# Patient Record
Sex: MALE | Race: WHITE | NOT HISPANIC OR LATINO | Employment: OTHER | URBAN - METROPOLITAN AREA
[De-identification: names, ages, dates, MRNs, and addresses within clinical notes are randomized per-mention and may not be internally consistent; named-entity substitution may affect disease eponyms.]

---

## 2022-05-05 ENCOUNTER — PATIENT MESSAGE (OUTPATIENT)
Dept: RESEARCH | Facility: HOSPITAL | Age: 77
End: 2022-05-05

## 2024-06-12 ENCOUNTER — OFFICE VISIT (OUTPATIENT)
Dept: NEUROLOGY | Facility: CLINIC | Age: 79
End: 2024-06-12
Payer: MEDICARE

## 2024-06-12 VITALS
DIASTOLIC BLOOD PRESSURE: 74 MMHG | HEIGHT: 72 IN | WEIGHT: 175 LBS | BODY MASS INDEX: 23.7 KG/M2 | SYSTOLIC BLOOD PRESSURE: 122 MMHG

## 2024-06-12 DIAGNOSIS — G61.81 CIDP (CHRONIC INFLAMMATORY DEMYELINATING POLYNEUROPATHY): ICD-10-CM

## 2024-06-12 DIAGNOSIS — G20.B1 PARKINSON'S DISEASE WITH DYSKINESIA WITHOUT FLUCTUATING MANIFESTATIONS: Primary | ICD-10-CM

## 2024-06-12 PROCEDURE — 99214 OFFICE O/P EST MOD 30 MIN: CPT | Mod: PBBFAC | Performed by: SPECIALIST

## 2024-06-12 PROCEDURE — 99999 PR PBB SHADOW E&M-EST. PATIENT-LVL IV: CPT | Mod: PBBFAC,,, | Performed by: SPECIALIST

## 2024-06-12 PROCEDURE — 99205 OFFICE O/P NEW HI 60 MIN: CPT | Mod: S$PBB,,, | Performed by: SPECIALIST

## 2024-06-12 RX ORDER — IMMUNE GLOBULIN (HUMAN) 10 G/100ML
66 INJECTION INTRAVENOUS; SUBCUTANEOUS
Qty: 660 ML | Refills: 5 | Status: SHIPPED | OUTPATIENT
Start: 2024-07-19

## 2024-06-12 RX ORDER — CARBIDOPA AND LEVODOPA 25; 100 MG/1; MG/1
TABLET ORAL
Qty: 360 TABLET | Refills: 11 | Status: SHIPPED | OUTPATIENT
Start: 2024-06-12

## 2024-06-12 RX ORDER — MYCOPHENOLATE MOFETIL 500 MG/1
TABLET ORAL
Qty: 360 TABLET | Refills: 3 | Status: SHIPPED | OUTPATIENT
Start: 2024-06-12 | End: 2024-06-13

## 2024-06-12 NOTE — PROGRESS NOTES
Subjective:      @Patient ID: John M Chevalier is a 78 y.o. male.    Chief Complaint/referral reason/HPI:   Chief Complaint   Patient presents with    NP ref by Dr Lawrence for neuro cons to michelle for Neuropathy.      C/O B arm, B Hand, B Leg and back weakness. Dx w PD in 2016. B hand tremors. Denies a freezing shuffled gait. Denies hallucinations. Diff w speech, swallowing and hallucinations. Pt is anxious. Not sleeping well wo vivid dreams. Moves a lot in his sleep, arms sling around. Drives only with a 1 mile radius, lives at home w his wife and needs asst w dressing.        They live in [place] / [facility]   they'd seen Dr Itzel Sahu at [facility] and she'd taught Benjamin Lawrence thus they went to see Howard in [facility] [ but he's moved now also]   They'd also seen Ondo a number of times [mvment disorder]    Addn MD hx comments:  CD LD 1.5 qid   05, 100  1430  1700   On IVIg last 9 mo or so    q 3w then a mo ago increased it to every other week [to treat foot drop]     See also 'facesheet' under media for handwritten notes     Review of Systems         Marital status:   sloane Collazoe a   age 66 or so     [] Working     [x] Retired, worked as:   [x] Drives     'very little'     -------------------------------------    CIDP (chronic inflammatory demyelinating polyneuropathy)    Parkinson's disease     ..  Current Outpatient Medications   Medication Instructions    amantadine HCL (SYMMETREL) 100 mg, Oral, 2 times daily    carbidopa-levodopa  mg (SINEMET)  mg per tablet TAKE 1 AND 1/2 TABLET BY MOUTH 4 TIMES A DAY    cholecalciferol (vitamin D3) (VITAMIN D3) 1,000 Units, Oral    cyanocobalamin (vitamin B-12) 1,000 mcg, Sublingual    DULoxetine (CYMBALTA) 60 mg, Oral    FA/niacinamide/cupric ox/Zn ox (NICOTINAMIDE ORAL) 500 mg, 2 times daily    fludrocortisone (FLORINEF) 200 mcg, Oral, Daily    GAMUNEX-C 5 gram/50 mL (10 %) Soln No dose, route, or frequency recorded.    Lactobacillus rhamnosus GG  (CULTURELLE) 10 billion cell capsule 1 capsule, Oral, Daily    meloxicam (MOBIC) 7.5 mg, Oral, Daily    tamsulosin (FLOMAX) 0.4 mg Cap 1 capsule, Oral, Daily    vitamin B complex-folic acid 0.4 mg Tab 1 tablet, Oral, Daily      Fludrocort 0.1mg once daily     Objective:      Exam:   Visit Vitals  /74   Ht 6' (1.829 m)   Wt 79.4 kg (175 lb)   BMI 23.73 kg/m²     General Exam  If Accompanied, by__       body habitus_ Body mass index is 23.73 kg/m².  Gen exam     Neurological:    Exam comments:  Facial grimace  Otherw cn's ok   Motor in UE's ok   L ankle df weakness only discernible in chair   in other words walking in his boots I could not discern which ankle was his weaker one   No tremor but BUE cogwheeling   R toe tonically up and some R foot tremor               No data to display                 Neuroimaging:  [] Images and imaging reports reviewed. Rads summary:  My comments:     Labs:    [x]  New Patient         []  Multiple Issues/ diagnoses or problems  [if not enumerated in note then discussed but not documented]    Complexity of Data:   [x] High    [] Moderate   [] Images and reports reviewed [x] History obtained from accompaniment  [] Studies ordered [] Studies consid or discussed, not ordered   [] Differential Diagnoses discussed       Risks:   [x] High     [] Moderate   [x] (poss or def) neurodegenerative condition [] () autoimmune condition with possibility of flares or unexpected attack  [] () seiz d.o. with possib of recurr seiz's  [] Cerebrovasc ds with risk of recurrent stroke  [] CNS meds (and/or) potentially high risk non CNS meds taken or discussed which may cause med or behav SE's  [x] Fall risk [x] Driving discussed  [] Diagnosis unclear or DDx wide making risk uncertain   []:    MDM:    [] High     [] Moderate       Assessment/Plan:         ICD-10-CM ICD-9-CM   1. Parkinson's disease with dyskinesia without fluctuating manifestations  G20.B1 332.0   2. CIDP (chronic inflammatory  demyelinating polyneuropathy)  G61.81 357.81         Other Comments / Follow Up:        Medications Ordered This Encounter   Medications    carbidopa-levodopa  mg (SINEMET)  mg per tablet     Si TABLET BY MOUTH 4 TIMES A DAY     Dispense:  360 tablet     Refill:  11    GAMUNEX-C 5 gram/50 mL (10 %) Soln     Sig: Inject 650 mLs (65 g total) into the vein every 30 days.     Dispense:  660 mL     Refill:  5    mycophenolate (CELLCEPT) 500 mg Tab     Simg bid one month then 1000mg bid     Dispense:  360 tablet     Refill:  3      No orders of the defined types were placed in this encounter.     Patient Instructions    Stop amantadine   Decrease levodopa to one tab four times daily   Mycophenolate 500mg twice daily one month then 100mg twice daily ([facility])   IVig monthly [[facility] )    Aim revisit 2 mos       Franklin Perry MD HUE FAAN, [facility]  Neuroscience Center Medical Director   [facility]

## 2024-06-13 DIAGNOSIS — G61.81 CIDP (CHRONIC INFLAMMATORY DEMYELINATING POLYNEUROPATHY): ICD-10-CM

## 2024-06-13 RX ORDER — MYCOPHENOLATE MOFETIL 500 MG/1
TABLET ORAL
Start: 2024-06-13 | End: 2025-07-13

## 2024-06-13 RX ORDER — MYCOPHENOLATE MOFETIL 500 MG/1
TABLET ORAL
Qty: 360 TABLET | Refills: 3 | Status: SHIPPED | OUTPATIENT
Start: 2024-06-13 | End: 2025-07-13

## 2024-06-14 ENCOUNTER — TELEPHONE (OUTPATIENT)
Dept: NEUROLOGY | Facility: CLINIC | Age: 79
End: 2024-06-14
Payer: MEDICARE

## 2024-06-14 NOTE — TELEPHONE ENCOUNTER
Spec Cust care Team: [facility]: [phone]: More  ..  Pharm: [phone]: Minnie: pharmacist: gave verbal order for change in Gamunex: provider/dose changes; will continue all premed orders as previous.  She will fax over update to sign off  ..  Once order is in and reviewed by secondary pharmacist, will go to [facility] service for nursing staff to be alerted to changes

## 2024-07-19 ENCOUNTER — PATIENT MESSAGE (OUTPATIENT)
Dept: NEUROLOGY | Facility: CLINIC | Age: 79
End: 2024-07-19
Payer: MEDICARE

## 2024-09-03 ENCOUNTER — PATIENT MESSAGE (OUTPATIENT)
Dept: NEUROLOGY | Facility: CLINIC | Age: 79
End: 2024-09-03
Payer: MEDICARE

## 2024-09-04 NOTE — TELEPHONE ENCOUNTER
Pharmacist [facility] contacted office regarding attached portal message and wanted to verify that pt was ok to proceed w Gamunex infusion next week.    Call back #: [phone]  Opt 2 for pharmacist line

## 2024-09-19 ENCOUNTER — OFFICE VISIT (OUTPATIENT)
Dept: NEUROLOGY | Facility: CLINIC | Age: 79
End: 2024-09-19
Payer: MEDICARE

## 2024-09-19 VITALS
DIASTOLIC BLOOD PRESSURE: 98 MMHG | BODY MASS INDEX: 25.06 KG/M2 | HEIGHT: 72 IN | SYSTOLIC BLOOD PRESSURE: 140 MMHG | WEIGHT: 185 LBS

## 2024-09-19 DIAGNOSIS — G47.52 RBD (REM BEHAVIORAL DISORDER): ICD-10-CM

## 2024-09-19 DIAGNOSIS — G20.B1 PARKINSON'S DISEASE WITH DYSKINESIA WITHOUT FLUCTUATING MANIFESTATIONS: Primary | ICD-10-CM

## 2024-09-19 DIAGNOSIS — G61.81 CIDP (CHRONIC INFLAMMATORY DEMYELINATING POLYNEUROPATHY): ICD-10-CM

## 2024-09-19 PROCEDURE — 99999 PR PBB SHADOW E&M-EST. PATIENT-LVL III: CPT | Mod: PBBFAC,,, | Performed by: SPECIALIST

## 2024-09-19 PROCEDURE — 99215 OFFICE O/P EST HI 40 MIN: CPT | Mod: S$PBB,,, | Performed by: SPECIALIST

## 2024-09-19 PROCEDURE — 99213 OFFICE O/P EST LOW 20 MIN: CPT | Mod: PBBFAC | Performed by: SPECIALIST

## 2024-09-19 RX ORDER — DULOXETIN HYDROCHLORIDE 30 MG/1
CAPSULE, DELAYED RELEASE ORAL
Qty: 180 CAPSULE | Refills: 3 | Status: SHIPPED | OUTPATIENT
Start: 2024-09-19

## 2024-09-19 RX ORDER — MYCOPHENOLATE MOFETIL 500 MG/1
1000 TABLET ORAL 2 TIMES DAILY
Qty: 360 TABLET | Refills: 3
Start: 2024-09-19 | End: 2025-09-14

## 2024-09-19 RX ORDER — CHLORHEXIDINE GLUCONATE ORAL RINSE 1.2 MG/ML
5 SOLUTION DENTAL
COMMUNITY
Start: 2024-08-28

## 2024-09-19 RX ORDER — EPINEPHRINE 0.3 MG/.3ML
INJECTION SUBCUTANEOUS
COMMUNITY

## 2024-09-19 RX ORDER — IMMUNE GLOBULIN (HUMAN) 10 G/100ML
66 INJECTION INTRAVENOUS; SUBCUTANEOUS
Qty: 660 ML | Refills: 5 | Status: SHIPPED | OUTPATIENT
Start: 2024-09-19

## 2024-09-19 RX ORDER — CARBIDOPA AND LEVODOPA 25; 100 MG/1; MG/1
TABLET ORAL
Qty: 540 TABLET | Refills: 11 | Status: SHIPPED | OUTPATIENT
Start: 2024-09-19

## 2024-09-19 NOTE — PROGRESS NOTES
"Established Parkinson's Patient   SUBJECTIVE:  Patient ID: John M Chevalier   78 y.o.     Chief Complaint: PD fu    History of Present Illness:  John M Chevalier is a 78 y.o. male who presents to clinic for Parkinsons Disease follow-up   CIDP & PD   Pt presents to clinic w/ wife for a 2 month f/u. Pt reports numbness in legs remain the same and reports having drop foot in L leg. States B hand tremor has also remained the same. Worsening balance. Pt denies hallucinations or vivid dreams. Wife states pt sleeps "terribly." Reports some RBD, acting out in his sleep. Wife reports diff swallowing, drooling, and choking. Denies shuffled/freezing gait. Dyskinesias have stopped since being off the amantadine. [place], doesn't drive in town.     Currently taking CD/LD 1.5 tabs QID  On IVIG once a month    Review of Systems: as per HPI, otherwise a balanced 10 systems review is negative.        Past Medical History:   Diagnosis Date    CIDP (chronic inflammatory demyelinating polyneuropathy)     Parkinson's disease        Past Surgical History:   Procedure Laterality Date    HERNIA REPAIR      Removal of plate      SKIN CANCER EXCISION Bilateral 04/04/2023    Nose       Family History   Problem Relation Name Age of Onset    No Known Problems Mother      Cancer Father      Heart attack Father         Social History     Socioeconomic History    Marital status:    Tobacco Use    Smoking status: Never     Passive exposure: Never    Smokeless tobacco: Never   Substance and Sexual Activity    Alcohol use: Never    Drug use: Never    Sexual activity: Not Currently     Partners: Female       Review of patient's allergies indicates:  No Known Allergies    Current Medications:  Current Outpatient Medications   Medication Instructions    carbidopa-levodopa  mg (SINEMET)  mg per tablet TAKE 1 AND 1/2 TABLET BY MOUTH 4 TIMES A DAY    chlorhexidine (PERIDEX) 0.12 % solution 5 mLs    " cholecalciferol (vitamin D3) (VITAMIN D3) 1,000 Units, Oral    cyanocobalamin (vitamin B-12) 1,000 mcg, Sublingual    DULoxetine (CYMBALTA) 60 mg, Oral    EPINEPHrine (EPIPEN) 0.3 mg/0.3 mL AtIn Intramuscular    fludrocortisone (FLORINEF) 100 mcg, Oral    GAMUNEX-C 5 gram/50 mL (10 %) Soln 65 g, Intravenous, Every 30 days    meloxicam (MOBIC) 7.5 mg, Oral, Daily    mycophenolate (CELLCEPT) 500 mg Tab Take 1 tablet (500 mg total) by mouth 2 (two) times daily for 30 days, THEN 2 tablets (1,000 mg total) 2 (two) times daily.    mycophenolate (CELLCEPT) 500 mg Tab Take 1 tablet (500 mg total) by mouth 2 (two) times daily for 30 days, THEN 2 tablets (1,000 mg total) 2 (two) times daily.    tamsulosin (FLOMAX) 0.4 mg Cap 1 capsule, Oral, Daily    vitamin B complex-folic acid 0.4 mg Tab 1 tablet, Oral, Daily       OBJECTIVE:  Vitals:  BP (!) 140/98 (BP Location: Left arm, Patient Position: Sitting)   Ht 6' (1.829 m)   Wt 83.9 kg (185 lb)   BMI 25.09 kg/m²      Physical Exam:  General Exam  Accompanied by - wife  appearance - flat affect; decreased eye blink  heart - RRR auscultated without murmur  lungs - pulmonary effort normal  skin- normal for ethnicity, no obvious lesions noted    Neurologic Exam  Cortical function - The patient is alert, attentive, and oriented  Speech - hypophonia  Cranial nerves:  CN III, IV, VI -  EOMs intact. No ptosis or lateral gaze deviation  CN VII - no facial asymmetry; normal eye closure and smile  CN VIII - hearing is grossly normal  CN XII - tongue protrudes midline  Motor - No pronator drift. UE strength bilateral 5/5  Gait - unassisted, stooped posture. Steppage gait. Decreased arm swing. Precarious turns  Coordination - finger to nose intact, no dysmetria  tremor - none during visit today  Bradykinesia - moderate    Review of Data:   Prior notes reviewed     Imaging:  No results found for this or any previous visit.      ASSESSMENT /PLAN:    1. Parkinson's disease with dyskinesia  without fluctuating manifestations  -     carbidopa-levodopa  mg (SINEMET)  mg per tablet; TAKE 1 AND 1/2 TABLET BY MOUTH 4 TIMES A DAY  Dispense: 540 tablet; Refill: 11    Parkinson's medications can be associated with certain side effects.  Nausea and abdominal symptoms typically improve in time.  Impulse control disorders including persistent thoughts or behaviors involving shopping, gambling, or sex can be problematic.  Excessive daytime sleepiness including car crashes have been reported.   Delusions, hallucinations, and paranoia can also occur, typically with higher doses in older patients.   Abrupt stoppage of high dose parkinson's medications can be medically troublesome.      2. CIDP (chronic inflammatory demyelinating polyneuropathy)  -     mycophenolate (CELLCEPT) 500 mg Tab; Take 2 tablets (1,000 mg total) by mouth 2 (two) times daily.  Dispense: 360 tablet; Refill: 3  -     GAMUNEX-C 5 gram/50 mL (10 %) Soln; Inject 650 mLs (65 g total) into the vein every 6 weeks.  Dispense: 660 mL; Refill: 5  -decrease frequency of IVIg to q6 weeks.     3. RBD (REM behavioral disorder)  -     DULoxetine (CYMBALTA) 30 MG capsule; Take 1-2 capsules daily  Dispense: 180 capsule; Refill: 3    Pt was previously taking 60 mg daily. Discussed getting off of duloxetine may help to improve his sleep. Advised pt and wife that he can cut back to 30mg/1 tab for a couple weeks then stop if he's doing ok without it. Wife states he takes this for back pain but we'd advised him that Cymbalta helps with peripheral neuropathy symptoms but the Mobic he's on is better for back pain.     Fall risks / precautions discussed.    Driving discussed.     Questions and concerns were sought and answered to the patient's stated verbal satisfaction.    The patient verbalizes understanding and agreement with the above stated treatment plan.   Items discussed include acute and/or chronic neurological, sleep, or other issues and their  attendant differential diagnoses.  Potential for additional testing, treatment options, and prognosis also discussed.    Follow up 6 months virtual visit    Dr. Perry physically present in exam room during patient encounter.   I, Ami Fairbanks NP acted solely as a scribe for and in the presence of Dr. Perry who performed the service.    KAELYN Joyner  [facility]  [phone]

## 2024-10-15 ENCOUNTER — PATIENT MESSAGE (OUTPATIENT)
Dept: RESEARCH | Facility: HOSPITAL | Age: 79
End: 2024-10-15
Payer: MEDICARE

## 2024-11-02 ENCOUNTER — PATIENT MESSAGE (OUTPATIENT)
Dept: NEUROLOGY | Facility: CLINIC | Age: 79
End: 2024-11-02
Payer: MEDICARE

## 2024-11-02 DIAGNOSIS — G61.81 CIDP (CHRONIC INFLAMMATORY DEMYELINATING POLYNEUROPATHY): ICD-10-CM

## 2024-11-02 DIAGNOSIS — G20.B1 PARKINSON'S DISEASE WITH DYSKINESIA WITHOUT FLUCTUATING MANIFESTATIONS: Primary | ICD-10-CM

## 2024-11-18 RX ORDER — MELOXICAM 7.5 MG/1
7.5 TABLET ORAL DAILY
Qty: 90 TABLET | Refills: 1 | Status: SHIPPED | OUTPATIENT
Start: 2024-11-18 | End: 2025-11-18

## 2025-01-16 ENCOUNTER — PATIENT MESSAGE (OUTPATIENT)
Dept: NEUROLOGY | Facility: CLINIC | Age: 80
End: 2025-01-16
Payer: MEDICARE

## 2025-01-17 NOTE — TELEPHONE ENCOUNTER
[facility]: [phone]: Jayme Elias auth: [id number]  Valid dates: 1/17/25-1/17/26  -----------  [facility]: [phone]: Sarah; she will enter the updated PA into the account

## 2025-02-19 ENCOUNTER — OFFICE VISIT (OUTPATIENT)
Dept: NEUROLOGY | Facility: CLINIC | Age: 80
End: 2025-02-19
Payer: MEDICARE

## 2025-02-19 DIAGNOSIS — G20.B1 PARKINSON'S DISEASE WITH DYSKINESIA WITHOUT FLUCTUATING MANIFESTATIONS: Primary | ICD-10-CM

## 2025-02-19 DIAGNOSIS — G61.81 CIDP (CHRONIC INFLAMMATORY DEMYELINATING POLYNEUROPATHY): ICD-10-CM

## 2025-02-19 DIAGNOSIS — F51.04 CHRONIC INSOMNIA: ICD-10-CM

## 2025-02-19 PROCEDURE — 98006 SYNCH AUDIO-VIDEO EST MOD 30: CPT | Mod: 95,,, | Performed by: SPECIALIST

## 2025-02-19 RX ORDER — CARBIDOPA AND LEVODOPA 25; 100 MG/1; MG/1
TABLET ORAL
Qty: 720 TABLET | Refills: 3 | Status: SHIPPED | OUTPATIENT
Start: 2025-02-19

## 2025-02-19 NOTE — PROGRESS NOTES
This is a telemedicine note. See bottom of note for boilerplate elements.     John M Chevalier is a 79 y.o. male seen today via telemedicine visit.   Subjective:    Patient ID: John M Chevalier is a 79 y.o. male.  Chief Complaint: virtual follow up for dx or symptoms: PD CIDP     HPI:         insomnia   Trouble prolonged walking    posture poor       Takes CD LD 4 times per day last dose 530  gets in bed after 10       Current Outpatient Medications   Medication Instructions    carbidopa-levodopa  mg (SINEMET)  mg per tablet TAKE 1 AND 1/2 TABLET BY MOUTH 4 TIMES A DAY    chlorhexidine (PERIDEX) 0.12 % solution 5 mLs    cholecalciferol (vitamin D3) (VITAMIN D3) 1,000 Units, Oral    cyanocobalamin (vitamin B-12) 1,000 mcg, Sublingual    DULoxetine (CYMBALTA) 30 MG capsule Take 1-2 capsules daily    EPINEPHrine (EPIPEN) 0.3 mg/0.3 mL AtIn Intramuscular    fludrocortisone (FLORINEF) 100 mcg, Oral    GAMUNEX-C 5 gram/50 mL (10 %) Soln 65 g, Intravenous, Every 6 weeks    meloxicam (MOBIC) 7.5 mg, Oral, Daily    mycophenolate (CELLCEPT) 1,000 mg, Oral, 2 times daily    tamsulosin (FLOMAX) 0.4 mg Cap 1 capsule, Oral, Daily    vitamin B complex-folic acid 0.4 mg Tab 1 tablet, Oral, Daily        Objective:      Exam Limited due to telemedicine restrictions.  There were no vitals taken for this visit.  if accompanied, by:_w  Speech: some dysarthria and dyskinesia of head and neck     flat affect   bradykinesia /facial hypomimia       MDM:      [] High   [x] Moderate   Assessment/Plan:     Problem List Items Addressed This Visit          Neuro    Parkinson's disease with dyskinesia without fluctuating manifestations - Primary    CIDP (chronic inflammatory demyelinating polyneuropathy)       Other    Chronic insomnia       Other comments/ follow up:      Medications Ordered This Encounter   Medications    carbidopa-levodopa  mg (SINEMET)  mg per tablet     Sig: TAKE 1 AND 1/2 TABLET BY MOUTH 5 TIMES A  DAY; bedtime dose added today     Dispense:  720 tablet     Refill:  3   Incr CD LD to 5 times per day adding bedtime dose     Aim f to f visit 4mo     Video Time Documentation:  Spent 8 minutes with patient over video discussing health concerns.   Total time this visit:    12  minutes    This is a telemedicine note.   Patient was treated using telemedicine, real time audio and video, according to [facility] protocols. This visit is not recorded.  The patient participated in the visit at a non-[facility] location selected by the patient, [facility].   I am licensed in [place] where the patient stated they are located.

## 2025-04-05 DIAGNOSIS — G20.B1 PARKINSON'S DISEASE WITH DYSKINESIA WITHOUT FLUCTUATING MANIFESTATIONS: ICD-10-CM

## 2025-04-05 DIAGNOSIS — G61.81 CIDP (CHRONIC INFLAMMATORY DEMYELINATING POLYNEUROPATHY): ICD-10-CM

## 2025-04-07 RX ORDER — MELOXICAM 7.5 MG/1
7.5 TABLET ORAL
Qty: 90 TABLET | Refills: 1 | Status: SHIPPED | OUTPATIENT
Start: 2025-04-07

## 2025-06-16 DIAGNOSIS — G61.81 CIDP (CHRONIC INFLAMMATORY DEMYELINATING POLYNEUROPATHY): ICD-10-CM

## 2025-06-16 RX ORDER — MYCOPHENOLATE MOFETIL 500 MG/1
1000 TABLET ORAL 2 TIMES DAILY
Qty: 360 TABLET | Refills: 3 | OUTPATIENT
Start: 2025-06-16

## 2025-07-03 DIAGNOSIS — G61.81 CIDP (CHRONIC INFLAMMATORY DEMYELINATING POLYNEUROPATHY): ICD-10-CM

## 2025-07-03 RX ORDER — MYCOPHENOLATE MOFETIL 500 MG/1
1000 TABLET ORAL 2 TIMES DAILY
Qty: 60 TABLET | Refills: 5 | Status: SHIPPED | OUTPATIENT
Start: 2025-07-03 | End: 2025-07-08 | Stop reason: SDUPTHER

## 2025-07-07 ENCOUNTER — OFFICE VISIT (OUTPATIENT)
Dept: NEUROLOGY | Facility: CLINIC | Age: 80
End: 2025-07-07
Payer: MEDICARE

## 2025-07-07 VITALS
WEIGHT: 187 LBS | BODY MASS INDEX: 26.18 KG/M2 | HEIGHT: 71 IN | DIASTOLIC BLOOD PRESSURE: 86 MMHG | SYSTOLIC BLOOD PRESSURE: 138 MMHG

## 2025-07-07 DIAGNOSIS — G20.B1 PARKINSON'S DISEASE WITH DYSKINESIA WITHOUT FLUCTUATING MANIFESTATIONS: Primary | ICD-10-CM

## 2025-07-07 DIAGNOSIS — G61.81 CIDP (CHRONIC INFLAMMATORY DEMYELINATING POLYNEUROPATHY): ICD-10-CM

## 2025-07-07 PROCEDURE — 99214 OFFICE O/P EST MOD 30 MIN: CPT | Mod: S$PBB,,, | Performed by: SPECIALIST

## 2025-07-07 PROCEDURE — 99213 OFFICE O/P EST LOW 20 MIN: CPT | Mod: PBBFAC | Performed by: SPECIALIST

## 2025-07-07 PROCEDURE — 99999 PR PBB SHADOW E&M-EST. PATIENT-LVL III: CPT | Mod: PBBFAC,,, | Performed by: SPECIALIST

## 2025-07-07 RX ORDER — MELATONIN 3 MG
CAPSULE ORAL
COMMUNITY

## 2025-07-07 NOTE — PROGRESS NOTES
Subjective:         Patient ID: John M Chevalier is a 79 y.o. male.    Chief Complaint/HPI:   Chief Complaint   Patient presents with    CIDP     States that his legs are getting weaker and that its getting harder for him to walk. Denies use of canes/walkers. Does not go to PT. Needs minimal assist w/ ADL's. Denies falls. States that he has foot drop in his L foot, stated that it comes and goes. Reports imbalance. Takes Delta 9 for sleep w/ melatonin.      ;;;;;;;  History of Present Illness    CHIEF COMPLAINT:  Patient presents today for follow up of Parkinson's disease and CIDP.    PARKINSON'S DISEASE:  He takes Parkinson's medication 4 times daily, 1.5 tablets each time for a total of 6 tablets per day. He reports an increased pulling sensation affecting his gait and making ambulation more challenging.    CHRONIC INFLAMMATORY DEMYELINATING POLYNEUROPATHY (CIDP):  He continues IVIG treatments every 6 weeks and mycophenolate 1000 mg twice daily. He denies any significant neurological symptoms or complications at this time.    SLEEP:  He reports ongoing sleep difficulties, previously characterized by prolonged tossing and turning before falling asleep. He currently uses THC gummy 10 mg at bedtime, which has improved his ability to fall asleep, though not completely resolving the issues. He is not currently distressed about his sleep management approach.    SAFETY:  He denies any recent falls since initiating current medication regimens.      ROS:  ROS as indicated in HPI.       ;;;;;;;;;  Addn HPI:            ...  notes may also be on facesheet for HPI, ROS, and other sections   ROS:           Lives with ___w    Current Outpatient Medications   Medication Instructions    carbidopa-levodopa  mg (SINEMET)  mg per tablet TAKE 1 AND 1/2 TABLET BY MOUTH 5 TIMES A DAY; bedtime dose added today    chlorhexidine (PERIDEX) 0.12 % solution 5 mLs    cholecalciferol (vitamin D3) (VITAMIN D3) 1,000 Units     "cyanocobalamin (vitamin B-12) 1,000 mcg    DULoxetine (CYMBALTA) 30 MG capsule Take 1-2 capsules daily    EPINEPHrine (EPIPEN) 0.3 mg/0.3 mL AtIn Inject into the muscle.    fludrocortisone (FLORINEF) 100 mcg, Oral    GAMUNEX-C 5 gram/50 mL (10 %) Soln 65 g, Intravenous, Every 6 weeks    melatonin 3 mg Cap Oral    meloxicam (MOBIC) 7.5 mg, Oral    mycophenolate (CELLCEPT) 1,000 mg, Oral, 2 times daily    tamsulosin (FLOMAX) 0.4 mg Cap 1 capsule, Daily    vitamin B complex-folic acid 0.4 mg Tab 1 tablet, Daily       Objective:      Exam  /86 (BP Location: Left arm, Patient Position: Sitting)   Ht 5' 11" (1.803 m)   Wt 84.8 kg (187 lb)   BMI 26.08 kg/m²   ;;;;;;;;;;;;;  Physical Exam     gait unassisted, no tremor.  Anteroflexed   Neurological: No pronator drift.  Eyes: VF normal.       ;;;;;;;;;;;          MDM:  Moderate  despite no orders         Assessment/Plan:       ;;;;;;;;;  Assessment & Plan          ;;;;;;    ICD-10-CM ICD-9-CM   1. Parkinson's disease with dyskinesia without fluctuating manifestations  G20.B1 332.0   2. CIDP (chronic inflammatory demyelinating polyneuropathy)  G61.81 357.81   G20.C Parkinsonism, unspecified  G61.81 Chronic inflammatory demyelinating polyneuritis  R26.81 Unsteadiness on feet  F51.01 Primary insomnia  Z79.891 Long term (current) use of opiate analgesic    PLAN SUMMARY:  - Continue mycophenolate 1000 mg twice daily  - Continue IVIG treatment every 6 weeks for CIDP  - Maintain carbidopa/levodopa at 1.5 tablets 4 times daily  - Contact office for interim video visit if needed  - Follow up in 6 months for Parkinson's disease management    PARKINSON'S DISEASE:  - Assessed Parkinson's disease management, noting increased difficulty with walking but no recent falls.  - Evaluated current medication regimen and maintained carbidopa/levodopa 1.5 tablets 4 times daily (6 tablets total) due to age and risk of side effects at higher doses.  - Considered and explained potential " risks of increasing Parkinson's medication dosage, including dyskinesia, hallucinations, paranoia, and loss of touch with reality/uncontrolled thoughts or behaviors.  - Continue carbidopa/levodopa 1.5 tablets 4 times daily.    CHRONIC INFLAMMATORY DEMYELINATING POLYNEURITIS (CIDP):  - Reviewed ongoing CIDP treatment with IVIG every 6 weeks and mycophenolate 1000 mg twice daily.  - Continue IVIG treatment every 6 weeks.  - Continue mycophenolate 1000 mg twice daily.    FOLLOW-UP:  - Follow up in 6 months.  - Contact the office for interim video visit if needed before next appointment.        ;;;;;;;This note was generated with the assistance of ambient listening technology. Verbal consent was obtained by the patient and accompanying visitor(s) for the recording of patient appointment to facilitate this note. I attest to having reviewed and edited the generated note for accuracy, though some syntax or spelling errors may persist. Please contact the author of this note for any clarification.  This my 2nd note using teresa Perry MD HUE FAAN FAASM

## 2025-07-08 DIAGNOSIS — G61.81 CIDP (CHRONIC INFLAMMATORY DEMYELINATING POLYNEUROPATHY): ICD-10-CM

## 2025-07-08 RX ORDER — MYCOPHENOLATE MOFETIL 500 MG/1
1000 TABLET ORAL 2 TIMES DAILY
Qty: 360 TABLET | Refills: 3 | Status: SHIPPED | OUTPATIENT
Start: 2025-07-08 | End: 2026-07-03

## 2025-08-06 DIAGNOSIS — G20.B1 PARKINSON'S DISEASE WITH DYSKINESIA WITHOUT FLUCTUATING MANIFESTATIONS: ICD-10-CM

## 2025-08-06 RX ORDER — CARBIDOPA AND LEVODOPA 25; 100 MG/1; MG/1
TABLET ORAL
Qty: 720 TABLET | Refills: 3 | Status: SHIPPED | OUTPATIENT
Start: 2025-08-06

## 2025-08-19 DIAGNOSIS — G20.B1 PARKINSON'S DISEASE WITH DYSKINESIA WITHOUT FLUCTUATING MANIFESTATIONS: ICD-10-CM

## 2025-08-19 RX ORDER — CARBIDOPA AND LEVODOPA 25; 100 MG/1; MG/1
1 TABLET ORAL
Qty: 360 TABLET | Refills: 11 | OUTPATIENT
Start: 2025-08-19